# Patient Record
Sex: MALE | Race: WHITE | Employment: PART TIME | URBAN - METROPOLITAN AREA
[De-identification: names, ages, dates, MRNs, and addresses within clinical notes are randomized per-mention and may not be internally consistent; named-entity substitution may affect disease eponyms.]

---

## 2024-08-26 ENCOUNTER — HOSPITAL ENCOUNTER (EMERGENCY)
Age: 44
Discharge: HOME | End: 2024-08-26
Payer: COMMERCIAL

## 2024-08-26 VITALS
DIASTOLIC BLOOD PRESSURE: 73 MMHG | TEMPERATURE: 98 F | OXYGEN SATURATION: 99 % | BODY MASS INDEX: 38.36 KG/M2 | HEIGHT: 76 IN | RESPIRATION RATE: 18 BRPM | SYSTOLIC BLOOD PRESSURE: 119 MMHG | HEART RATE: 74 BPM | WEIGHT: 315 LBS

## 2024-08-26 DIAGNOSIS — H00.011 HORDEOLUM EXTERNUM OF RIGHT UPPER EYELID: Primary | ICD-10-CM

## 2024-08-26 PROCEDURE — 99282 EMERGENCY DEPT VISIT SF MDM: CPT

## 2024-08-26 ASSESSMENT — PAIN - FUNCTIONAL ASSESSMENT
PAIN_FUNCTIONAL_ASSESSMENT: NONE - DENIES PAIN
PAIN_FUNCTIONAL_ASSESSMENT: NONE - DENIES PAIN

## 2024-08-26 ASSESSMENT — VISUAL ACUITY
OS: 25/20
OU: 20
OD: 25/20

## 2024-08-26 NOTE — DISCHARGE INSTRUCTIONS
Continue applying warm compresses to help the stye drain.  If swelling around the eye worsens please return to the emergency department.

## 2024-08-26 NOTE — ED TRIAGE NOTES
Chief Complaint   Patient presents with    Eye Problem     Pt states has had stye above right eye, states started on erythromycin on Friday, today sent by dr for evaluation

## 2024-08-26 NOTE — ED PROVIDER NOTES
[facility] ED  EMERGENCY DEPARTMENT ENCOUNTER        Pt Name: Sher Fitzpatrick  MRN: [id number]  Birthdate 1980  Date of evaluation: 8/26/2024  Provider: ANTONIO Glasgow  PCP: Dajuan Stevens MD  Note Started: 3:19 PM EDT 8/26/24      KIMI. I have evaluated this patient.        CHIEF COMPLAINT       Chief Complaint   Patient presents with    Eye Problem     Pt states has had stye above right eye, states started on erythromycin on Friday, today sent by dr for evaluation       HISTORY OF PRESENT ILLNESS: 1 or more Elements     History From: Patient    Limitations to history : None    Social Determinants Significantly Affecting Health : None    Chief Complaint: Edgar Fitzpatrick is a 44 y.o. male who presents to the emergency department for a stye on his right eye.  Patient states that this has been there for around 10 days.  He was seen on Friday and given erythromycin ointment.  Patient states that he does not feel as though it is getting better.  States that there is localized pain and swelling.  Does admit to seeing a reddish-yellow drainage on I compress.  Denies vision changes, fevers, chills, pain surrounding the eye.  Denies foreign body sensation.  Does admit to warm compresses helping.    Nursing Notes were all reviewed and agreed with or any disagreements were addressed in the HPI.    REVIEW OF SYSTEMS :      Review of Systems    Positives and Pertinent negatives as per HPI.     SURGICAL HISTORY     Past Surgical History:   Procedure Laterality Date    TONSILLECTOMY         CURRENTMEDICATIONS       Previous Medications    AMLODIPINE (NORVASC) 10 MG TABLET    Take 10 mg by mouth daily.    TRIAMTERENE-HYDROCHLOROTHIAZIDE (MAXZIDE-25) 37.5-25 MG PER TABLET    Take 1 tablet by mouth daily.       ALLERGIES     Patient has no known allergies.    FAMILYHISTORY     History reviewed. No pertinent family history.     SOCIAL HISTORY       Social History     Tobacco Use    Smoking status: